# Patient Record
Sex: FEMALE | Race: WHITE | Employment: UNEMPLOYED | URBAN - METROPOLITAN AREA
[De-identification: names, ages, dates, MRNs, and addresses within clinical notes are randomized per-mention and may not be internally consistent; named-entity substitution may affect disease eponyms.]

---

## 2018-06-01 ENCOUNTER — HOSPITAL ENCOUNTER (OUTPATIENT)
Age: 38
Discharge: HOME | End: 2018-06-01
Attending: EMERGENCY MEDICINE
Payer: COMMERCIAL

## 2018-06-01 VITALS
BODY MASS INDEX: 23.6 KG/M2 | WEIGHT: 125 LBS | RESPIRATION RATE: 16 BRPM | HEIGHT: 61 IN | SYSTOLIC BLOOD PRESSURE: 106 MMHG | OXYGEN SATURATION: 99 % | DIASTOLIC BLOOD PRESSURE: 62 MMHG | HEART RATE: 69 BPM | TEMPERATURE: 98 F

## 2018-06-01 DIAGNOSIS — R05.9 COUGH: Primary | ICD-10-CM

## 2018-06-01 PROCEDURE — 99203 OFFICE O/P NEW LOW 30 MIN: CPT

## 2018-06-01 PROCEDURE — 99204 OFFICE O/P NEW MOD 45 MIN: CPT

## 2018-06-01 RX ORDER — CEFDINIR 300 MG/1
300 CAPSULE ORAL 2 TIMES DAILY
Qty: 20 CAPSULE | Refills: 0 | Status: SHIPPED | OUTPATIENT
Start: 2018-06-01 | End: 2018-06-11

## 2018-06-01 NOTE — ED INITIAL ASSESSMENT (HPI)
Pt presents to the IC with c/o nasal congestion and mild cough for 1 week. Pt reports symptoms starting with n/v for 1 day and then the nasal congestion starting. Yellow/green discharge. Denies sore throat, ear pain or fevers.

## 2018-06-01 NOTE — ED PROVIDER NOTES
Patient Seen in: [address]    History   Patient presents with:  Cough/URI    Stated Complaint: congestion    HPI    This patient complains of nasal drainage and congestion with cough which has been present for the last auscultation  Cardiac there are normal first and second heart sounds  Abdomen is soft and nondistended  Extremities no edema  Neurologic there are no gross cranial nerve deficits patient moves all 4 extremities with intact strength        MDM     Discussed